# Patient Record
Sex: FEMALE | Race: WHITE
[De-identification: names, ages, dates, MRNs, and addresses within clinical notes are randomized per-mention and may not be internally consistent; named-entity substitution may affect disease eponyms.]

---

## 2017-03-16 NOTE — EDPHY
H & P


Time Seen by Provider: 03/16/17 16:02


HPI/ROS: 


CHIEF COMPLAINT: Found down, possible seizure





HISTORY OF PRESENT ILLNESS: This patient is a 56 year old female with history 

of grand mal and petite mal seizures who presents by EMS under a Limited Trauma 

Activation after she was found down on the sidewalk at [facility] this 

afternoon. Upon arrival, she is alert and oriented. Her primary complaint is 

head pain severity 7/10 localized to her posterior scalp with a scalp 

laceration. She denies neck or back pain. She does not remember the incident 

but reports that she suspects it is a seizure. She takes Lamictal for seizures 

and has not had any recent changes to her dosing. Medical history includes 

remote history of breast cancer and double mastectomy.





REVIEW OF SYSTEMS:


Constitutional: No weakness


Eyes: No visual changes or eye pain


Head: +pain to the posterior scalp


ENT: No dental trauma


Neck: No pain or injury


Respiratory: No shortness of breath


Cardiac: No chest pain


Gastrointestinal: No abdominal pain, no vomiting


Back: No pain or injury


Genitourinary: No hematuria


Musculoskeletal: No joint pain


Skin: +head laceration


Neurological: No headache, no dizziness





Past Medical/Surgical History: 


History of breast cancer with double mastectomy 10 years PTA. Seizures (Lamictal

); followed Dr. Blackman, neurologist.


Social History: 


Denies drug or alcohol use.


Physical Exam: 


General Appearance: Alert, pleasant


Head:  4 cm scalp laceration posteriorly


Eyes: No conjunctival erythema, PERRLA, EOMI


ENT, Mouth: No hemotympanum, no oral trauma, no bony tenderness


Neck: Non-tender, full range of motion without pain


Respiratory: No chest wall tenderness, lungs clear bilaterally


Cardiovascular:  Regular rate and rhythm 


Abdomen:  Abdomen is soft and non tender


Skin:  2cm posterior scalp laceration, no abrasions


Back: No midline T/L/S tenderness


Extremities:  Pelvis is stable and nontender; no extremity tenderness or 

deformity, full range of motion without pain


Neurological:  A&Ox3, normal motor function, normal sensory exam, cranial 

nerves intact


Psychiatric: Mood and affect normal





Constitutional: 


 Initial Vital Signs











Temperature (C)  37.2 C   03/16/17 16:09


 


Heart Rate  90   03/16/17 16:09


 


Respiratory Rate  18   03/16/17 16:09


 


Blood Pressure  135/91 H  03/16/17 16:09


 


O2 Sat (%)  99   03/16/17 16:09








 











O2 Delivery Mode               Room Air














Allergies/Adverse Reactions: 


 





Sulfa (Sulfonamide Antibiotics) Allergy (Verified 06/28/16 00:42)


 








Home Medications: 














 Medication  Instructions  Recorded


 


ALPRAZolam [Xanax 1 MG (*)] 1 mg PO DAILY PRN 06/28/16


 


Ibuprofen [Motrin (*)] 200 mg PO DAILY PRN 06/28/16


 


lamoTRIgine [LamICTAL 100 MG (*)] 200 mg PO DAILY18 06/28/16


 


lamoTRIgine [LamICTAL 100 MG (*)] 400 mg PO DAILY 06/28/16


 


Acetaminophen [Tylenol 325mg (*)] 650 mg PO Q4 PRN #0 tab 06/29/16


 


Zolpidem Tartrate [Ambien 10 mg] 10 mg PO HS 03/16/17














Medical Decision Making





- Diagnostics


Imaging: 


Study: CT of the head without IV contrast


Indication: Seizure, trauma


Results: CT scan of the head was obtained. The results of the study are: 

Negative noncontrast CT of the head with no intracranial posttraumatic sequela 

identified. The study was read by the radiologist, Dr. Eliezer Gaviria. I viewed the 

images myself on the PACS system.


Procedures: 


Procedure:  Laceration repair.





The 4cm laceration on the posterior scalp was anesthetized using lidocaine and 

epinephrine. The wound was irrigated, draped and explored to its base with a 

gloved finger. There were no deep structures involved. No foreign body 

palpable. The wound was repaired with staples. The wound repair was simple.  





ED Course/Re-evaluation: 





1603: Took EMS report at bedside. Found down, A&O x 2. Limited Trauma 

Activation downgraded on patient arrival.





This patient has a history of seizures for which she is followed by a 

neurologist and has been prescribed Lamictal. Her last reported seizure was 

petite mal four days prior to arrival; she does not recall the last time she 

had a grand mal seizure but suspects that this is what she experienced today. 

Her exam is benign apart from a laceration to the posterior scalp. Will proceed 

with wound care, laceration repair, and CT of the head without contrast.





1721: Laceration repair performed by myself (see procedure note).





The patient had a recurrent seizure while in the ED. Given this, she will need 

to be admitted for observation. 


IV established. 500mg IV Keppra with 100ml IV NS administered.  No recurrent 

seizures after IV Keppra.





1932: Consultation with Dr. Ethel Hanson, hospitalist, who accepts admission.





- Data Points


Laboratory Results: 


 Laboratory Results





 03/16/17 19:25 





 03/16/17 19:25 








Medications Given: 


 








Discontinued Medications





Levetiracetam 500 mg/ Sodium (Chloride)  105 mls @ 420 mls/hr IV EDNOW ONE


   Stop: 03/16/17 19:32


   Last Admin: 03/16/17 19:51 Dose:  105 mls


Miscellaneous Medication (Zolpidem Tartrate [Ambien 10 Mg])  10 mg PO HS ANTONIA


   Stop: 09/13/17 20:59


   Last Admin: 03/16/17 23:19 Dose:  10 mg


Ondansetron HCl (Zofran Odt)  4 mg PO ONCE ONE


   Stop: 03/16/17 20:38


   Last Admin: 03/16/17 17:30 Dose:  4 mg








Departure





- Departure


Disposition: [facility] Inpatient Acute


Clinical Impression: 


 Seizure





Scalp laceration


Qualifiers:


 Encounter type: initial encounter Qualified Code(s): S01.01XA - Laceration 

without foreign body of scalp, initial encounter





Condition: Fair


Report Scribed for: Jaclyn Christopher


Report Scribed by: Shell Alford


Date of Report: 03/16/17


Time of Report: 16:02

## 2017-03-17 NOTE — GHP
[f 
rep st]



                                                            HISTORY AND PHYSICAL





DATE OF ADMISSION:  03/16/2017



CHIEF COMPLAINT:  Found down.



HISTORY OF PRESENT ILLNESS:  This is a 56-year-old female with history of 
breast cancer as well as seizure disorder with complex, partial, and grand mall 
seizures who was brought to the emergency department after being found down on 
the sidewalk at [facility] this afternoon.  The patient does not recall 
what happened.  Per ER and EMS report, she presented to the ER as a limited 
trauma activation after she was found down bleeding from her head.  Upon 
arrival to the ED she was alert and oriented, complaining of headache and pain 
over her scalp.  The patient states that she has been compliant with her 
seizure medications.  She takes Lamictal.  Her last grand mal seizure was about 
3 weeks ago but she has been having many petite mal seizures which is not 
unusual for her.  She denies any fevers or chills.  She denies any head trauma 
prior to the episode today.  She denies any focal numbness or weakness.



PAST MEDICAL HISTORY:  

1.  Seizure disorder with petit mall and grand mall seizures, followed by Dr. Blackman in [place].

2.  Breast cancer status post bilateral mastectomy reconstruction and adjuvant 
therapy.



MEDICATIONS:  Reviewed.  Refer to [facility] for details.



ALLERGIES:  Sulfa.



SOCIAL HISTORY:  Lives in [place].  She denies any alcohol, tobacco, or illicit 
drug use.



FAMILY HISTORY:  Significant for breast cancer in her grandmother.



REVIEW OF SYSTEMS:  Comprehensive 10-point review of systems was done and is 
negative except for as mentioned in the HPI.



PHYSICAL EXAM:  VITAL SIGNS:  Blood pressure 122/75, pulse of 90 respiratory 
rate 16, O2 saturation 96% on room air.  Temperature afebrile.  GENERAL:  No 
acute distress.  HEAD:  Normocephalic.  There is a closed posterior scalp 
laceration with staples.  NECK:  Supple.  No lymphadenopathy.  CARDIOVASCULAR:  
S1, S.  no JVD.  No lower extremity edema.  PULMONARY:  Lungs are clear.  No 
wheezes, rales, or rhonchi.  ABDOMEN:  Soft, nontender, nondistended.  No 
guarding or rebound tenderness.  Normoactive bowel sounds.  EXTREMITIES:  No 
clubbing or cyanosis.  NEURO:  Cranial nerves 2-12 grossly intact.  No focal 
motor or sensory deficits.  Moves all extremities.  Vision is normal.  Speech 
is fluent.  SKIN:  Clear no rashes.  There is some erythema over her right AC.



DIAGNOSTICS:  WBC 18, hemoglobin 14, hematocrit 42.3, platelets 244.  Sodium 141
, potassium 3.8, chloride 104, CO2 13, BUN 8, creatinine 0.7, glucose 133.  



Head CT was reviewed and was negative for intracranial posttraumatic sequelae. 



Brain MRI done 06/28/2016 was reviewed.  Was negative for acute infarct.  There 
was no intra-axial enhancing lesions or abnormal leptomeningeal enhancement.  
There were a few nonspecific hyperintense T2 flair signal abnormalities in the 
white matter of the bilateral cerebral hemispheres.



ASSESSMENT AND PLAN:  This is a 56-year-old female with history of breast 
cancer and seizure disorder presenting with:  

1.  Breakthrough seizures resulting in head trauma.  Plan:  Patient will be 
placed on observation.  She has been loaded on Keppra in the emergency 
department.  Neurology will be consulted.  Will check LFTs and magnesium level 
to complete a metabolic workup for seizures.  

2.  Leukocytosis likely from stress demargination in the setting of seizure 
without obvious infectious etiology.  Plan:  Monitor for signs and symptoms of 
infection.

3.  Scalp laceration.  Plan:  Recommend staple removal per ER recommendations 
as well as wound care.





Job #:  [id number]

MTDD

## 2017-03-17 NOTE — GCON
[f rep st]



                                                                    CONSULTATION





REFERRING PHYSICIAN:  Nacho Tse DO



HISTORY:  The patient is a 56-year-old woman, who is presenting for evaluation of seizure and being 
found down.  History is obtained in review of the medical records, as well as discussion with the mariangel gonzales, but she does not know anything except being at some type of an appointment at [facility], and then waking up here in the emergency department.  She has a history of breast c
ancer, and says that perhaps 10 years ago started having seizures that she describes as petit mal an
d grand mal seizures.  She says the last time she had a grand mal seizure was about 2 months ago.  S
he describes medeiros mal seizures characterized by 5 minutes of being somewhat out of it and a little 
confused, but says that people looking at her might not even know she is having a seizure.  She is f
ollowed by Dr. Blackman at [facility] for neurologic care, and she is on Lamictal.  She told me she 
is taking 250 mg a day, but that is not what is documented in the chart.  She actually came to the e
mergency department when she was found down on the sidewalk at [address] [place] [address], and she has absolute
ly no memory for exactly what happened.  She was complaining of a headache when she got to the emerg
ency department, and was alert, though, and oriented.  She apparently takes her medicines regularly.
  She reports having some of the medeiros mal seizures as often as 3 times a week.  I think details are
 unreliable at this point. 



No fever, chills, nausea, vomiting, or diarrhea.  The patient has a prior history of breast cancer. 
 She has become homeless, although she says she stays at a shelter. 



She is not smoking or drinking or using drugs.



ALLERGIES:  Sulfa.



FAMILY HISTORY:  Notable for breast cancer in grandmother.



LISTED MEDICATIONS:  Lamotrigine 200 mg and 400 mg for a total of 600 mg daily, Xanax 1 mg as needed
.



PHYSICAL EXAM:  VITAL SIGNS:  Temperature is 37, blood pressure 102/63, pulse of 76, respirations 16
.  GENERAL:  She is well developed, in no acute distress.  EYES:  Clear.  NECK:  Supple.  No bruits 
or masses.  CARDIAC:  Regular rate and rhythm.  No murmur.  NEUROLOGIC:  She is awake, alert, attent
nichole, fully oriented.  Pupils 3 mm and reactive.  Extraocular movements are intact.  Normal facial se
nsation and strength.  Motor exam:  Normal muscle bulk and tone.  5/5 strength with no abnormal move
ments.  Sensation is preserved for temperature and light touch.  Reflexes are 2+ and symmetric.



LABORATORY STUDIES:  White count of 18,000.  Chemistries unremarkable except for bicarb of 13.  Norm
al liver enzymes.



IMPRESSION:  The patient has a history of seizures described as petit mal and grand mal.  The descri
ption of the petit mals may or may not represent complex partial phenomenon or other generalized sei
zure-type phenomena versus conversion disorder, but at this point, I have no evidence to directly co
ntradict that she is having intermittent seizures, including the generalized tonic-clonic events mago
t have been described.  She is on reasonable doses of medication for now.  I am not going to make an
y change.  She should follow up with Dr. Blackman when is she stabilized, and she seems to be doing
 relatively well at the moment, but wait until she is fully back to her baseline for discharge seems
 reasonable.  Total unit time was 55 minutes.





Job #:  [id number]

## 2017-03-17 NOTE — HOSPPROG
Hospitalist Progress Note


Assessment/Plan: 








# seizure, with known seizure disorder - improved; seen by Dr Lujan


   - cont keppra, lamictal


# scalp laceration - s/p repair in ED


# dizziness/possible concussion - still too dizzy to be discharged


   - IVF, symptomatic tx today


# leukocytosis - likely stress, recheck tomorrow


# homelessness





# dispo - likely tomorrow if dizziness improves





##


new pt to me


chart reviewed


CT head reviewed








Subjective: still very dizzy


Objective: 


 Vital Signs











Temp Pulse Resp BP Pulse Ox


 


 36.7 C   85   16   139/73 H  97 


 


 03/17/17 12:34  03/17/17 12:34  03/17/17 12:34  03/17/17 12:34  03/17/17 12:08








 











 03/16/17 03/17/17 03/18/17





 05:59 05:59 05:59


 


Intake Total   1000


 


Balance   1000














- Physical Exam


Constitutional: no apparent distress, appears nourished


Cardiovascular: regular rate and rhythym, no murmur, rub, or gallop


Respiratory: no respiratory distress, no rales or rhonchi, clear to auscultation


Gastrointestinal: normoactive bowel sounds, soft, non-tender abdomen, no 

palpable masses





ICD10 Worksheet


Patient Problems: 


 Problems











Problem Status Onset


 


Scalp laceration Acute  


 


Seizure Acute  


 


Seizure disorder Acute

## 2017-03-18 NOTE — HOSPPROG
Hospitalist Progress Note


Assessment/Plan: 


 patient is a 56-year-old female with history of breast cancer, seizure 

disorder who is brought to the emergency department after found down on the 

sidewalk.  She has no recollection of what happened.  Upon arrival to the ED 

she was alert and oriented.  Today is my 1st encounter with the patient.  Chart 

reviewed. Reviewed her care with Dr Lujan.





# seizure, with known seizure disorder - 


   - cont Keppra, Lamictal


   - CT of head showed nothing acute


   - Dr Lujan to decide if her doses should be changed





# scalp laceration - s/p repair in ED


   -will need staples removed in 7-10 days





# dizziness/likely a concussion - 


   - speech therapy, physical therapy, and occupational therapy seeing


   -having frequent headaches and dizziness





#hx of breast cancer 


   had a mastectomy





# leukocytosis -   Resolved





# homelessness


   - very concerned about discharging her with a concussion, dizziness, and 

seizures


   -CM to look at possible placement and to discuss with the patient





# dispo - pending/ she is not safe to be discharged











Subjective: Charline is c/o dizziness and headache.


Objective: 


 Vital Signs











Temp Pulse Resp BP Pulse Ox


 


 36.6 C   64   14   124/76 H  97 


 


 03/18/17 07:40  03/18/17 07:40  03/18/17 07:40  03/18/17 07:40  03/18/17 07:40








 Laboratory Results





 03/18/17 04:25 





 03/18/17 04:25 





 











 03/17/17 03/18/17 03/19/17





 05:59 05:59 05:59


 


Intake Total  1800 


 


Output Total  250 


 


Balance  1550 














- Physical Exam


Constitutional: no apparent distress, appears nourished, not in pain


Eyes: PERRL


Ears, Nose, Mouth, Throat: hearing normal


Cardiovascular: regular rate and rhythym


Respiratory: no respiratory distress


Gastrointestinal: normoactive bowel sounds


Skin: warm, other (staples on occipital area of head in place/ edges of 

laceration well approximated)


Musculoskeletal: no muscle tenderness


Neurologic: AAOx3


Psychiatric: interacting appropriately, not anxious





ICD10 Worksheet


Patient Problems: 


 Problems











Problem Status Onset


 


Scalp laceration Acute  


 


Seizure Acute  


 


Seizure disorder Acute

## 2017-03-19 NOTE — HOSPPROG
Hospitalist Progress Note


Assessment/Plan: 


 patient is a 56-year-old female with history of breast cancer, seizure 

disorder who is brought to the emergency department after found down on the 

sidewalk.  She has no recollection of what happened.  Upon arrival to the ED 

she was alert and oriented.  





# seizure, with known seizure disorder - 


   - cont Keppra, Lamictal


   - CT of head showed nothing acute


   - Dr Lujan to decide if her doses should be changed





# scalp laceration - s/p repair in ED


   -will need staples removed in 7-10 days





# dizziness/likely a concussion - 


   - speech therapy, physical therapy, and occupational therapy seeing


   - continues to be very dizzy/ concerned about her ambulating





#hx of breast cancer 


   had a mastectomy





# leukocytosis -   Resolved





# homelessness


   - very concerned about discharging her with a concussion, dizziness, and 

seizures


   -CM to look at possible placement and to discuss with the patient





# dispo - pending/ she is not safe to be discharged











Subjective: Charline continues to have a headache.


Objective: 


 Vital Signs











Temp Pulse Resp BP Pulse Ox


 


 37 C   72   14   120/72   96 


 


 03/19/17 07:28  03/19/17 07:28  03/19/17 07:28  03/19/17 07:28  03/19/17 07:28








 Laboratory Results





 03/18/17 04:25 





 03/18/17 04:25 





 











 03/18/17 03/19/17 03/20/17





 05:59 05:59 05:59


 


Intake Total 1800 1350 


 


Output Total 250 450 


 


Balance 1550 900 














- Physical Exam


Constitutional: no apparent distress, appears nourished, not in pain


Eyes: PERRL


Ears, Nose, Mouth, Throat: hearing normal, other (tongue lac on right side/

healing)


Cardiovascular: regular rate and rhythym


Respiratory: no respiratory distress


Gastrointestinal: normoactive bowel sounds


Skin: warm


Musculoskeletal: no muscle tenderness


Neurologic: AAOx3


Psychiatric: interacting appropriately, not anxious, not encephalopathic





ICD10 Worksheet


Patient Problems: 


 Problems











Problem Status Onset


 


Scalp laceration Acute  


 


Seizure Acute  


 


Seizure disorder Acute

## 2017-03-20 NOTE — HOSPPROG
Hospitalist Progress Note


Assessment/Plan: 


 patient is a 56-year-old female with history of breast cancer, seizure 

disorder who is brought to the emergency department after found down on the 

sidewalk.  She has no recollection of what happened.  Upon arrival to the ED 

she was alert and oriented.  





# seizure, with known seizure disorder - 


   - cont Keppra, Lamictal


   - CT of head showed nothing acute


   - she sees Dr TRENT Blackman at [facility]/ I spoke w her office

[place]


   - reviewed her care with neurology today/ their recommendation is to avoid 

any Xanax which she takes prn/ increases seizure threshold


   - she say she has not used Xanax in a long time


   - Lamictal level is pending





# scalp laceration - s/p repair in ED


   -will need staples removed in 7-10 days





# dizziness/likely a concussion - 


   - speech therapy, physical therapy, and occupational therapy seeing


   - continues to be very dizzy/ concerned about her ambulating


   -w hen I evaluated her today, she had difficulty with walking/ she doesn't 

want to go to a SNF





#hx of breast cancer 


   had a mastectomy





# leukocytosis -   Resolved





# homelessness


   - very concerned about discharging her with a concussion, dizziness, and 

seizures


   - she has a room at the shelter





# dispo - pending/ she is not safe to be discharged. Declining SNF/


Number for [facility] is [phone] (x [phone])/ I spoke with the 

MA who said Dr Blackman returns on Tuesday. Will ask next provider caring for 

Charline to call and discuss Lamictal dosing.  We have a Lamictal level pending/ 

there is no recent level in her office. Patient has been updated on this plan.  











Subjective: Charline is feeling better daily but having some difficulty w walking.


Objective: 


 Vital Signs











Temp Pulse Resp BP Pulse Ox


 


 36.9 C   70   16   132/77 H  97 


 


 03/20/17 07:27  03/20/17 07:27  03/20/17 07:27  03/20/17 07:27  03/20/17 07:27








 Laboratory Results





 03/18/17 04:25 





 03/18/17 04:25 





 











 03/19/17 03/20/17 03/21/17





 05:59 05:59 05:59


 


Intake Total 1350 1700 


 


Output Total 450 400 


 


Balance 900 1300 














- Physical Exam


Constitutional: no apparent distress, appears nourished, not in pain


Eyes: PERRL


Ears, Nose, Mouth, Throat: hearing normal


Respiratory: no respiratory distress


Gastrointestinal: normoactive bowel sounds


Skin: warm


Musculoskeletal: abnormal gait (gait is unstable/she walks with a widened gait)

, generalized weakness


Neurologic: AAOx3


Psychiatric: interacting appropriately, not anxious, not encephalopathic, 

thought process linear





ICD10 Worksheet


Patient Problems: 


 Problems











Problem Status Onset


 


Seizure disorder Acute  


 


Seizure Acute  


 


Scalp laceration Acute

## 2017-03-21 NOTE — PDIAF
- Diagnosis


Diagnosis: Seizure


Code Status: Full Code





- Medication Management


Discharge Medications: 


 Medications to Continue on Transfer





lamoTRIgine [LamICTAL 100 MG (*)] 200 mg PO DAILY18 06/28/16 [Last Taken 03/15/

17]


lamoTRIgine [LamICTAL 100 MG (*)] 400 mg PO DAILY 06/28/16 [Last Taken 03/16/17]


Acetaminophen [Tylenol 325mg (*)] 650 mg PO Q4 PRN #0 tab 06/29/16 [Last Taken 

Unknown]


Zolpidem Tartrate [Ambien 10 mg] 10 mg PO HS 03/16/17 [Last Taken 03/15/17]


Ibuprofen [Motrin (*)] 400 mg PO Q6HRS PRN #0 tab 03/21/17 [Last Taken Unknown]








Discharge Medications: Refer to the Discharge Home Medication list for PRN 

reason.





- Orders


Services needed: Registered Nurse, Physical Therapy, Occupational Therapy, 

Speech Language Pathologist





- Follow Up Care


Current Providers and Referrals: 


[facility] [Non Staff Provider (MD)] - As per Instructions

## 2017-03-21 NOTE — GDS
[f rep st]



                                                             DISCHARGE SUMMARY





DISCHARGE DIAGNOSES:  

1.  Seizure.

2.  Head laceration.

3.  Dizziness.

4.  History of breast cancer.

5.  Leukocytosis.

6.  Homelessness.



STUDIES/PROCEDURES:  

1.  CT of the head.

2.  Scalp laceration suturing.



PHYSICAL EXAMINATION:  GENERAL:  The patient is alert.  VITAL SIGNS:  Afebrile at 36.7, pulse 69, re
spiratory rate 14 blood pressure is 121/76.  She is saturating 99% on room air.  I have seen and salomon
luated the patient on the day of discharge.



HOSPITAL COURSE:  The patient is a 56-year-old female who was found down on the sidewalk.  She prese
nted to the emergency room via EMS and was diagnosed with: 

1.  Seizure:  She does have a known seizure disorder.  She is on Keppra as well as Lamictal.  She wi
ll follow up with her primary neurologist, Dr. Blackman, and she has been educated to avoid Xanax i
n the future as it increases her seizure threshold.  She is in agreement with this plan.

2.  Scalp laceration:  This has been stapled in the emergency room and will require staple removal i
n 3 days.

3.  Dizziness:  Patient likely suffered a concussion.  She has been evaluated by Physical Therapy, a
s well as Speech Therapy and Occupational Therapy.  Skilled nursing facility is recommended at the t
jostin of disposition.

4.  History of breast cancer:  This is stable.

5.  Leukocytosis:  This has resolved.

6.  Homelessness:  The patient is in a program with the shelter.



DISPOSITION:  The patient will be discharged to [facility] for further rehabilitation and management.
  She does suffer from some ataxia and other concerns.  She has been evaluated by Physical Therapy a
nd Occupational therapy.  



I spent greater than 35 minutes in the care, coordination, and management of the patient's dispositi
on.



DISCHARGE MEDICATIONS:  Please refer to EMR form.  She will continue on Lamictal as well as Motrin a
tracy Grandeien.





Job #:  [id number]

## 2017-06-01 ENCOUNTER — HOSPITAL ENCOUNTER (EMERGENCY)
Dept: HOSPITAL 80 - FED | Age: 57
LOS: 1 days | Discharge: HOME | End: 2017-06-02
Payer: COMMERCIAL

## 2017-06-01 VITALS — RESPIRATION RATE: 16 BRPM

## 2017-06-01 DIAGNOSIS — G40.909: Primary | ICD-10-CM

## 2017-06-01 DIAGNOSIS — Z85.3: ICD-10-CM

## 2017-06-01 DIAGNOSIS — Z59.0: ICD-10-CM

## 2017-06-01 LAB
% IMMATURE GRANULYOCYTES: 0.2 % (ref 0–1.1)
ABSOLUTE IMMATURE GRANULOCYTES: 0.03 10^3/UL (ref 0–0.1)
ABSOLUTE NRBC COUNT: 0 10^3/UL (ref 0–0.01)
ADD DIFF?: NO
ADD MORPH?: NO
ADD SCAN?: NO
ANION GAP SERPL CALC-SCNC: 17 MEQ/L (ref 8–16)
ATYPICAL LYMPHOCYTE FLAG: 0 (ref 0–99)
CALCIUM SERPL-MCNC: 10.1 MG/DL (ref 8.5–10.4)
CHLORIDE SERPL-SCNC: 103 MEQ/L (ref 97–110)
CO2 SERPL-SCNC: 20 MEQ/L (ref 22–31)
CREAT SERPL-MCNC: 0.9 MG/DL (ref 0.6–1)
ERYTHROCYTE [DISTWIDTH] IN BLOOD BY AUTOMATED COUNT: 13.2 % (ref 11.5–15.2)
ETHANOL SERPL-MCNC: < 10 MG/DL (ref 0–10)
FRAGMENT RBC FLAG: 0 (ref 0–99)
GFR SERPL CREATININE-BSD FRML MDRD: > 60 ML/MIN/{1.73_M2}
GLUCOSE SERPL-MCNC: 84 MG/DL (ref 70–100)
HCT VFR BLD CALC: 43 % (ref 38–47)
HGB BLD-MCNC: 14.7 G/DL (ref 12.6–16.3)
INR PPP: 0.92 (ref 0.83–1.16)
LEFT SHIFT FLG: 0 (ref 0–99)
LIPEMIA HEMOLYSIS FLAG: 90 (ref 0–99)
MCH RBC BLDCO QN: 31.6 PG (ref 27.9–34.1)
MCHC RBC AUTO-ENTMCNC: 34.2 G/DL (ref 32.4–36.7)
MCV RBC AUTO: 92.5 FL (ref 81.5–99.8)
NRBC-AUTO%: 0 % (ref 0–0.2)
PLATELET # BLD: 235 10^3/UL (ref 150–400)
PLATELET CLUMPS FLAG: 0 (ref 0–99)
PMV BLD AUTO: 11.1 FL (ref 8.7–11.7)
POTASSIUM SERPL-SCNC: 4.5 MEQ/L (ref 3.5–5.2)
PROTHROMBIN TIME: 12.3 SEC (ref 12–15)
RBC # BLD AUTO: 4.65 10^6/UL (ref 4.18–5.33)
SODIUM SERPL-SCNC: 140 MEQ/L (ref 134–144)
TROPONIN I SERPL-MCNC: < 0.012 NG/ML (ref 0–0.03)

## 2017-06-01 PROCEDURE — G0480 DRUG TEST DEF 1-7 CLASSES: HCPCS

## 2017-06-01 SDOH — ECONOMIC STABILITY - HOUSING INSECURITY: HOMELESSNESS: Z59.0

## 2017-06-01 NOTE — CPEKG
Heart Rate: 81

RR Interval: 741

P-R Interval: 144

QRSD Interval: 82

QT Interval: 396

QTC Interval: 460

P Axis: 53

QRS Axis: 7

T Wave Axis: 44

EKG Severity - NORMAL ECG -

EKG Impression: SINUS RHYTHM

Electronically Signed By: Rafael Navarrete 02-Jun-2017 07:11:53
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
## 2017-06-02 VITALS
OXYGEN SATURATION: 94 % | TEMPERATURE: 98.6 F | SYSTOLIC BLOOD PRESSURE: 141 MMHG | HEART RATE: 85 BPM | DIASTOLIC BLOOD PRESSURE: 80 MMHG

## 2017-06-03 ENCOUNTER — HOSPITAL ENCOUNTER (INPATIENT)
Dept: HOSPITAL 80 - FED | Age: 57
LOS: 5 days | Discharge: HOME | DRG: 101 | End: 2017-06-08
Attending: FAMILY MEDICINE | Admitting: INTERNAL MEDICINE
Payer: COMMERCIAL

## 2017-06-03 DIAGNOSIS — E86.0: ICD-10-CM

## 2017-06-03 DIAGNOSIS — Z85.3: ICD-10-CM

## 2017-06-03 DIAGNOSIS — R42: ICD-10-CM

## 2017-06-03 DIAGNOSIS — T42.75XA: ICD-10-CM

## 2017-06-03 DIAGNOSIS — Z59.0: ICD-10-CM

## 2017-06-03 DIAGNOSIS — R74.0: ICD-10-CM

## 2017-06-03 DIAGNOSIS — G40.409: Primary | ICD-10-CM

## 2017-06-03 LAB
% IMMATURE GRANULYOCYTES: 0.2 % (ref 0–1.1)
ABSOLUTE IMMATURE GRANULOCYTES: 0.02 10^3/UL (ref 0–0.1)
ABSOLUTE NRBC COUNT: 0 10^3/UL (ref 0–0.01)
ADD DIFF?: NO
ADD MORPH?: NO
ADD SCAN?: NO
ALBUMIN SERPL-MCNC: 5 G/DL (ref 3.5–5)
ALP SERPL-CCNC: 81 IU/L (ref 38–126)
ALT SERPL-CCNC: 136 IU/L (ref 9–52)
ANION GAP SERPL CALC-SCNC: 18 MEQ/L (ref 8–16)
AST SERPL-CCNC: 209 IU/L (ref 14–46)
ATYPICAL LYMPHOCYTE FLAG: 0 (ref 0–99)
BILIRUB SERPL-MCNC: 2.8 MG/DL (ref 0.1–1.4)
BILIRUBIN-CONJUGATED: 0.9 MG/DL (ref 0–0.5)
BILIRUBIN-UNCONJUGATED: 1.9 MG/DL (ref 0–1.1)
CALCIUM SERPL-MCNC: 9.9 MG/DL (ref 8.5–10.4)
CHLORIDE SERPL-SCNC: 102 MEQ/L (ref 97–110)
CO2 SERPL-SCNC: 19 MEQ/L (ref 22–31)
COLOR UR: (no result)
CREAT SERPL-MCNC: 0.9 MG/DL (ref 0.6–1)
ERYTHROCYTE [DISTWIDTH] IN BLOOD BY AUTOMATED COUNT: 12.6 % (ref 11.5–15.2)
ETHANOL SERPL-MCNC: < 10 MG/DL (ref 0–10)
FRAGMENT RBC FLAG: 0 (ref 0–99)
GFR SERPL CREATININE-BSD FRML MDRD: > 60 ML/MIN/{1.73_M2}
GLUCOSE SERPL-MCNC: 69 MG/DL (ref 70–100)
HCT VFR BLD CALC: 43.5 % (ref 38–47)
HGB BLD-MCNC: 15 G/DL (ref 12.6–16.3)
LEFT SHIFT FLG: 0 (ref 0–99)
LIPEMIA HEMOLYSIS FLAG: 90 (ref 0–99)
MCH RBC BLDCO QN: 31.7 PG (ref 27.9–34.1)
MCHC RBC AUTO-ENTMCNC: 34.5 G/DL (ref 32.4–36.7)
MCV RBC AUTO: 92 FL (ref 81.5–99.8)
NRBC-AUTO%: 0 % (ref 0–0.2)
PLATELET # BLD: 281 10^3/UL (ref 150–400)
PLATELET CLUMPS FLAG: 0 (ref 0–99)
PMV BLD AUTO: 11.4 FL (ref 8.7–11.7)
POTASSIUM SERPL-SCNC: 4.6 MEQ/L (ref 3.5–5.2)
PROT SERPL-MCNC: 8.4 G/DL (ref 6.3–8.2)
RBC # BLD AUTO: 4.73 10^6/UL (ref 4.18–5.33)
SALICYLATES SERPL-MCNC: < 1 MG/DL (ref 2–20)
SODIUM SERPL-SCNC: 139 MEQ/L (ref 134–144)

## 2017-06-03 PROCEDURE — G0378 HOSPITAL OBSERVATION PER HR: HCPCS

## 2017-06-03 PROCEDURE — G0480 DRUG TEST DEF 1-7 CLASSES: HCPCS

## 2017-06-03 SDOH — ECONOMIC STABILITY - HOUSING INSECURITY: HOMELESSNESS: Z59.0

## 2017-06-03 NOTE — EDPHY
H & P


Stated Complaint: M1


Source: Police





- Personal History


Current Tetanus/Diphtheria Vaccine: Yes


Current Tetanus Diphtheria and Acellular Pertussis (TDAP): Yes





- Medical/Surgical History


Hx Asthma: No


Hx Chronic Respiratory Disease: No


Hx Diabetes: No


Hx Cardiac Disease: No


Hx Renal Disease: No


Hx Cirrhosis: No


Hx Alcoholism: No


Hx HIV/AIDS: No


Hx Splenectomy or Spleen Trauma: No


Other PMH: SZ DISORDER, BREAST CANCER, KNEE SURGERY





- Social History


Smoking Status: Never smoked


Time Seen by Provider: 06/03/17 11:22


HPI/ROS: 


CHIEF COMPLAINT:  AMS, compatible





HISTORY OF PRESENT ILLNESS:  This is a 57-year-old female brought in by police 

department, please reports patient was found knocking on doors threatening 

people stating "your trying to take my baby" police stated she was threatening 

them as well.  Patient agitated pacing.  Patient was seen here on 06/01/2017 

for similar symptoms, AMS CT head was negative EKG was normal discharge on her 

own.  Patient states she would like some food "I have any eaten in several weeks

". 





REVIEW OF SYSTEMS:


Constitutional:  No fever, no chills.


Eyes:  No blurred vision


ENT:  No sore throat.


Cardiovascular:  No chest pain, no palpitations.


Respiratory:  No cough, no shortness of breath.


Gastrointestinal:  No abdominal pain, no vomiting.


Genitourinary:  No difficulty urinating


Musculoskeletal:  No back pain.


Skin:  No rashes.


Neurological:  No headache. (Марина Cavazos)





- Physical Exam


Exam: 


General Appearance:  Alert, no distress.


Eyes:  Pupils equal and round no pallor or injection.


ENT, Mouth:  Mucous membranes moist.


Respiratory:  There are no retractions, lungs are clear to auscultation.


Cardiovascular:  Regular rate and rhythm.


Gastrointestinal:  Abdomen is soft and nontender, no masses


Neurological: memory loss, has episodes of cognitive lucidity.  


Skin:  Warm and dry, no rashes.


Musculoskeletal:  Neck is supple nontender.


Extremities:  symmetrical, full range of motion.


Psychiatric:  Patient is oriented to person, month and date , there is agitation

, anger.


 (Марина Cavazos)


Constitutional: 





 Initial Vital Signs











Temperature (C)  36.4 C   06/03/17 09:59


 


Heart Rate  91   06/03/17 09:59


 


Respiratory Rate  16   06/03/17 09:59


 


Blood Pressure  99/88 H  06/03/17 09:59


 


O2 Sat (%)  94   06/03/17 09:59








 











O2 Delivery Mode               Room Air














Allergies/Adverse Reactions: 


 





Sulfa (Sulfonamide Antibiotics) Allergy (Verified 06/01/17 23:14)


 








Home Medications: 














 Medication  Instructions  Recorded


 


Zolpidem Tartrate [Ambien 10 mg] 10 mg PO HS 03/16/17


 


lamoTRIgine [Lamictal] 200 mg PO DAILY #30 tablet 06/02/17


 


ALPRAZolam [Xanax 1 MG (*)] 1 mg PO BID PRN 06/03/17














Medical Decision Making


ED Course/Re-evaluation: 


Spoke with Kali with [facility], she states there is no psych  component to her 

altered mental status more likely dementia.





1225:  Discussed with Dr. Fitzgerald, patient to be admitted for AMS 





1230:  Patient inpatient admit  to Dr. Gonzales





1245:  Spoke with Tisha with case management on the hospital unit, patient 

will be evaluated for outpatient placement during her hospital stay (Марина Cavazos)





I did not see this patient while she was in the emergency department.  However 

her care was discussed with PA while the patient was in the department.  I 

agree with treatment plan and management (Uche Fitzgerald)


Differential Diagnosis: 


Other differential diagnosis considered but not limited to seizure, CVA and 

electrolyte imbalance (Марина Cavazos)





- Data Points


Laboratory Results: 





 Laboratory Results





 06/03/17 11:18 





 06/03/17 11:18 





 











  06/03/17 06/03/17 06/03/17





  11:30 11:18 11:18


 


WBC      





    


 


RBC      





    


 


Hgb      





    


 


Hct      





    


 


MCV      





    


 


MCH      





    


 


MCHC      





    


 


RDW      





    


 


Plt Count      





    


 


MPV      





    


 


Neut % (Auto)      





    


 


Lymph % (Auto)      





    


 


Mono % (Auto)      





    


 


Eos % (Auto)      





    


 


Baso % (Auto)      





    


 


Nucleat RBC Rel Count      





    


 


Absolute Neuts (auto)      





    


 


Absolute Lymphs (auto)      





    


 


Absolute Monos (auto)      





    


 


Absolute Eos (auto)      





    


 


Absolute Basos (auto)      





    


 


Absolute Nucleated RBC      





    


 


Immature Gran %      





    


 


Immature Gran #      





    


 


Sodium      





    


 


Potassium      





    


 


Chloride      





    


 


Carbon Dioxide      





    


 


Anion Gap      





    


 


BUN      





    


 


Creatinine      





    


 


Estimated GFR      





    


 


Glucose      





    


 


Calcium      





    


 


Total Bilirubin    2.8 mg/dL H mg/dL  





    (0.1-1.4)  


 


Conjugated Bilirubin    0.9 mg/dL H mg/dL  





    (0.0-0.5)  


 


Unconjugated Bilirubin    1.9 mg/dL H mg/dL  





    (0.0-1.1)  


 


AST    209 IU/L H IU/L  





    (14-46)  


 


ALT    136 IU/L H IU/L  





    (9-52)  


 


Alkaline Phosphatase    81 IU/L IU/L  





    ()  


 


Total Protein    8.4 g/dL H g/dL  





    (6.3-8.2)  


 


Albumin    5.0 g/dL g/dL  





    (3.5-5.0)  


 


Beta HCG, Qual      NEGATIVE 





    


 


Salicylates      





    


 


Urine Opiates Screen  NEGATIVE     





   (NEGATIVE)   


 


Urine Barbiturates  NEGATIVE     





   (NEGATIVE)   


 


Ur Phencyclidine Scrn  NEGATIVE     





   (NEGATIVE)   


 


Ur Amphetamine Screen  NEGATIVE     





   (NEGATIVE)   


 


U Benzodiazepines Scrn  NEGATIVE     





   (NEGATIVE)   


 


Urine Cocaine Screen  NEGATIVE     





   (NEGATIVE)   


 


U Marijuana (THC) Screen  NEGATIVE     





   (NEGATIVE)   


 


Ethyl Alcohol      





    














  06/03/17 06/03/17





  11:18 11:18


 


WBC    11.01 10^3/uL H 10^3/uL





    (3.80-9.50) 


 


RBC    4.73 10^6/uL 10^6/uL





    (4.18-5.33) 


 


Hgb    15.0 g/dL g/dL





    (12.6-16.3) 


 


Hct    43.5 % %





    (38.0-47.0) 


 


MCV    92.0 fL fL





    (81.5-99.8) 


 


MCH    31.7 pg pg





    (27.9-34.1) 


 


MCHC    34.5 g/dL g/dL





    (32.4-36.7) 


 


RDW    12.6 % %





    (11.5-15.2) 


 


Plt Count    281 10^3/uL 10^3/uL





    (150-400) 


 


MPV    11.4 fL fL





    (8.7-11.7) 


 


Neut % (Auto)    78.9 % H %





    (39.3-74.2) 


 


Lymph % (Auto)    12.3 % L %





    (15.0-45.0) 


 


Mono % (Auto)    8.4 % %





    (4.5-13.0) 


 


Eos % (Auto)    0.0 % L %





    (0.6-7.6) 


 


Baso % (Auto)    0.2 % L %





    (0.3-1.7) 


 


Nucleat RBC Rel Count    0.0 % %





    (0.0-0.2) 


 


Absolute Neuts (auto)    8.70 10^3/uL H 10^3/uL





    (1.70-6.50) 


 


Absolute Lymphs (auto)    1.35 10^3/uL 10^3/uL





    (1.00-3.00) 


 


Absolute Monos (auto)    0.92 10^3/uL H 10^3/uL





    (0.30-0.80) 


 


Absolute Eos (auto)    0.00 10^3/uL L 10^3/uL





    (0.03-0.40) 


 


Absolute Basos (auto)    0.02 10^3/uL 10^3/uL





    (0.02-0.10) 


 


Absolute Nucleated RBC    0.00 10^3/uL 10^3/uL





    (0-0.01) 


 


Immature Gran %    0.2 % %





    (0.0-1.1) 


 


Immature Gran #    0.02 10^3/uL 10^3/uL





    (0.00-0.10) 


 


Sodium  139 mEq/L mEq/L  





   (134-144)  


 


Potassium  4.6 mEq/L mEq/L  





   (3.5-5.2)  


 


Chloride  102 mEq/L mEq/L  





   ()  


 


Carbon Dioxide  19 mEq/l L mEq/l  





   (22-31)  


 


Anion Gap  18 mEq/L H mEq/L  





   (8-16)  


 


BUN  40 mg/dL H mg/dL  





   (7-23)  


 


Creatinine  0.9 mg/dL mg/dL  





   (0.6-1.0)  


 


Estimated GFR  > 60   





   


 


Glucose  69 mg/dL L mg/dL  





   ()  


 


Calcium  9.9 mg/dL mg/dL  





   (8.5-10.4)  


 


Total Bilirubin    





   


 


Conjugated Bilirubin    





   


 


Unconjugated Bilirubin    





   


 


AST    





   


 


ALT    





   


 


Alkaline Phosphatase    





   


 


Total Protein    





   


 


Albumin    





   


 


Beta HCG, Qual    





   


 


Salicylates  < 1.0 mg/dL L mg/dL  





   (2.0-20.0)  


 


Urine Opiates Screen    





   


 


Urine Barbiturates    





   


 


Ur Phencyclidine Scrn    





   


 


Ur Amphetamine Screen    





   


 


U Benzodiazepines Scrn    





   


 


Urine Cocaine Screen    





   


 


U Marijuana (THC) Screen    





   


 


Ethyl Alcohol  < 10 mg/dL mg/dL  





   (0-10)  














Departure





- Departure


Disposition: [facility]


Clinical Impression: 


Altered mental status


Qualifiers:


 Altered mental status type: transient alteration of awareness Qualified Code(s)

: R40.4 - Transient alteration of awareness





Condition: Good

## 2017-06-03 NOTE — GHP
[f rep st]



                                                            HISTORY AND PHYSICAL





DATE OF ADMISSION:  06/03/2017



CHIEF COMPLAINT:  Altered mental status.



HISTORY OF PRESENT ILLNESS:  This is a 57-year-old female known to our service with a history of sei
zure disorders, who was brought to the emergency department by the emergency department after she wa
s found reportedly knocking on doors and threatening people stating, "You're trying to take my baby.
"  The patient was agitated and pacing.  She was seen at [facility] on [facility]/01/2017, where she had some similar symptoms and was ultimately sent home after her mentati
on cleared. 



During the time of my exam, the patient is still confused and disoriented.  She does not know where 
she is, but is able to tell me the year.  She has been living in her car.  She has not been taking h
er seizure medications.  She tells me that she has been having multiple seizures and bit her tongue 
a few days ago.  She denies any alcohol.  She denies any tobacco.  She denies any illicit drug use.



PAST MEDICAL HISTORY:  

1.  Seizure disorder with petite mal and grand mal seizures, followed by Dr. Blackman in [place].

2.  Breast cancer status post bilateral mastectomy and reconstruction with adjuvant therapy.



HOME MEDICATIONS:  Reviewed.  Refer to [facility] for details.



ALLERGIES:  Sulfa.



SOCIAL HISTORY:  She lives at [place] in her car.  She denies any alcohol, tobacco, or illicit drug 
use.



FAMILY HISTORY:  Reviewed and significant for breast cancer in her grandmother.



REVIEW OF SYSTEMS:  A comprehensive 10-point review of systems was done and was negative, except for
 as mentioned in the HPI.



PHYSICAL EXAM:  VITAL SIGNS:  Blood pressure 125/75, pulse is 70, respiratory rate 20, O2 saturation
 98% on room air.  GENERAL:  In no acute distress.  HEENT:  Head:  Normocephalic, atraumatic.  Eyes:
  PERRLA.  Sclerae anicteric.  Mouth:  Moist mucous membranes.  Right lateral tongue has a healing l
aceration.  There is no active bleeding.  No signs of infection.  NECK:  Supple.  No lymphadenopathy
.  CARDIOVASCULAR:  S1, S2.  No murmurs, rubs, clicks, gallops.  No JVD.  No lower extremity edema. 
 PULMONARY:  Lungs are clear.  No wheezes, rales, or rhonchi.  ABDOMEN:  Soft, nontender, nondistend
ed.  No guarding or rebound tenderness.  Normoactive bowel sounds.  EXTREMITIES:  No clubbing or cya
nosis.  NEURO:  Cranial nerves II through XII grossly intact.  No focal motor or sensory deficits.  
SKIN:  Clear no rashes.



DIAGNOSTICS:  WBC is 11.01, hemoglobin 15, hematocrit 43.5, platelets 281.  Sodium 139, potassium 4.
6, chloride 102, CO2 19, BUN 40, creatinine 0.9, glucose 69.  Urine tox screen from 06/03/2017 was u
nremarkable.  Ethyl alcohol was nondetectable.



ASSESSMENT:  This is a 57-year-old female with a history of seizure disorders presenting with acute 
encephalopathy of unclear etiology.  Differential diagnosis includes uncontrolled seizure disorder v
ersus a parasomnia from Ambien (which cannot be confirmed since the patient does not recall taking h
er Ambien) versus dehydration.



PLAN:  The patient will be placed on observation where I will start her on Keppra and continue her h
ome dose of Lamictal.  Will consult Neurology.  Will also start IV hydration.  Will check LFTs.  A h
ead CT done 03/16/2017 showed no intracranial pathology.  An MRI of her brain was done in June of 20
16 which also did not show any intracranial lesions.  Will consider repeating the MRI if her conditi
on does not improve.





Job #:  [id number]

## 2017-06-04 LAB
ANION GAP SERPL CALC-SCNC: 6 MEQ/L (ref 8–16)
CALCIUM SERPL-MCNC: 9 MG/DL (ref 8.5–10.4)
CHLORIDE SERPL-SCNC: 107 MEQ/L (ref 97–110)
CO2 SERPL-SCNC: 25 MEQ/L (ref 22–31)
CREAT SERPL-MCNC: 0.8 MG/DL (ref 0.6–1)
GFR SERPL CREATININE-BSD FRML MDRD: > 60 ML/MIN/{1.73_M2}
GLUCOSE SERPL-MCNC: 96 MG/DL (ref 70–100)
POTASSIUM SERPL-SCNC: 4 MEQ/L (ref 3.5–5.2)
SODIUM SERPL-SCNC: 138 MEQ/L (ref 134–144)

## 2017-06-04 NOTE — HOSPPROG
Hospitalist Progress Note


Assessment/Plan: 





#acute encephalopathy (resolving) 


   -she experienced was sounds like significant delusions. She denies a h/o 

psychiatric illness. I am concerned that her confusion was triggered by 

uncontrolled epilepsy


 


Plan:


   -cont keppra and lamictal as ordered


   -neuro consult pending





#resolved dehydration


   -buff cap ivf and encourage oral hydration





#h/o breast cancer 


   -last brain MRI was 6/2016 will defer repeating the study to neurology





#transaminitis


   -will repeat labs in AM





Disposition: I have discussed the case with case management. At this point she 

does not seem to be a safe discharge to the streets. Currently she is living in 

her car and doesn't know where her car is located. Her daughter lives and 

[place]. The patient would like to move back to [place]


Will change to inpatient status


Subjective: no seizure overnight.  confusion is improving.  she recalls some of 

her delusions from the past few days.  she continues to deny any substance abuse


Objective: 


 Vital Signs











Temp Pulse Resp BP Pulse Ox


 


 36.4 C   84   14   92/57 L  98 


 


 06/04/17 11:33  06/04/17 11:33  06/04/17 11:33  06/04/17 11:33  06/04/17 11:33








 Laboratory Results





 06/04/17 04:37 





 











 06/03/17 06/04/17 06/05/17





 05:59 05:59 05:59


 


Intake Total  1100 


 


Balance  1100 














- Physical Exam


Constitutional: no apparent distress, appears nourished, not in pain


Cardiovascular: regular rate and rhythym, no murmur, rub, or gallop


Respiratory: no respiratory distress, no rales or rhonchi, clear to auscultation


Neurologic: AAOx3, CN II-XII Intact, No facial droop





ICD10 Worksheet


Patient Problems: 


 Problems











Problem Status Onset


 


Seizure disorder Acute  


 


Seizure Acute  


 


Scalp laceration Acute  


 


Altered mental status Acute

## 2017-06-04 NOTE — PDCONSULT
Consultant Note: 








HOSPITAL NEUROLOGY CONSULT


REQUESTING: Nacho Tse DO


REASON: AMS





HPI:





This is a 57 year-old woman with a history of breast cancer s/p chemoradiation 

and bilateral mastectomies.  She also has a history of seizures secondary to 

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


The patient had presented to our ED with some nonspecific AMS on 6/1.  She was 

brought from [facility] by EMS as staff reported AMS.  She was cleared and 

discharged at that time.  She returned to our facility on 6/3, this time by 

Lemuel Scott, as the patient had been going [id number] threatening people 

and accusing them of "trying to take my baby."  She was found to be confused, 

agitated on ER arrival.  She relayed a history of being homeless; She was 

previously living in her car, but it had apparently been towed and impounded 

with all of her possessions.  She has slightly improved since arrival.  She was 

noted to have a tongue laceration.  Labs revealed a leukocytosis, metabolic 

acidosis, elevated BUN/Cr ration, elevated bilirubin and transaminitis.


She tells me this morning that she does indeed have "grand mal" and "petit mal" 

seizures and was taking lamotrigine.  She in unsure of the dose.  She states 

she has not taken it recently.  She can't remember if she hadn't taken it due 

to not filling the medicine recently or not having it due to her car being 

impounded.  She is amnestic as to why she is here.  She tells me the last thing 

she remembers is a gentleman on the street offering her a beer, to which she 

accepted and drank.  She denies drinking in excess and denies ingestion of 

illicit substances.  Her EtOH level and UDS were negative.  


On review of her records in [facility], she has been admitted to our facility 

multiple times for breakthrough seizure, with varying reports of her 

antiseizure drug regimen.  She sees Dr. Blackman at [facility], but she can't 

identify the last time she saw this provider.  She was admitted to [facility] last 

year with a similar picture of AMS and agitation, threatening to pull a gun on 

people.  She had extensive workup including an unremarkable MRI.  She had a 

continuous vEEG which showed some right temporal slowing that resolved over 

time.  She had a psychiatry evaluation, raising the possibility of underlying 

hypomania/bipolar vs. postictal psychosis.  Risperidone 1mg nightly was started 

at that time, but seemingly she has not continued to take the medication.  With 

her prior admissions here, she has had MRI and EEG that were unremarkable as 

well. 





ROS:


As per the HPI, otherwise a complete 12 point ROS was performed and is negative





ALLERGIES AND MEDS:


As recorded in the EMR - reviewed and reconciled





PFSH:


As per the intake H&P by Dr. Tse from yesterday  





EXAM:


VS reviewed in EMR


GEN: WDWN laying in NAD


HEENT: NCAT, sclera anicteric, conjunctiva not injected, MMM, oropharynx clear, 

no scalp tenderness, right lateral tongue edema noted


NECK: supple, nontender, no meningismus


CV: RRR s1 s2 wo m/r/c/g.  Carotid pulses 2+ wo bruit


NEURO:


MS: awake, alert, oriented to self, birthdate (but not her own age), month, year

, not specific date, hospital, not situation.  Speech nondysarthric, but 

content tangential.  No language disturbance.  Follows commands.  Attends to 

both sides.  Clear episodic memory impairment on casual conversation.  Mood 

euthymic.  Adequate fund of knowledge.


CN: pupils 4mm round and reactive.  Intolerant of fundoscopy.  VFF.  Primary 

gaze centered.  Full ocular motility, but smooth pursuit with saccadic 

intrusions/motor impersistence.  Facial sensation preserved.  Face symmetric.   

Hearing grossly intact to finger rub.  Palatoglossal movements intact.  

Shoulder shrug and head turn strong.


MOTOR: normal bulk/tone.  No adventitial movements.  Full power throughout.


SENSORY: intact LT/PP throughout and symmetric.  No extinction.


COORD: no ataxia FN/HS.  Anthony preserved.  


REFLEX: plantars down.  No clonus.  DTRS 1/4.


GAIT: deferred to PT safety eval











DATA REVIEW:


Labs reviewed in EMR








PERSONALLY INTERPRETED RESULTS AND DATA:


CT head wo from 6/1 is unremarkable


Outside records from [facility] reviewed via [facility] as per the HPI








IMPRESSION AND RECOMMENDATIONS:





// ENCEPHALOPATHY


// SEIZURE





Patient with a history of seizures reportedly from MTS after getting brain 

radiation for reported CNS metastases from breast cancer, initially manifest in 

2007.


Seemingly similar picture to when she was admitted to [facility] last year, with 

extensive evaluation.


Suspect this may be more of a post-ictal psychosis given labs, tongue laceration

/edema and prior history.  Unknown what her cognitive baseline is, but she does 

have a substrate for memory deficit (brain radiation, MTS).


Seems she is noncompliant with her prescribed lamotrigine therapy.  She also is 

dehydrated with transaminitis.  There remains the possibility of another 

underlying metabolic issue, though, given her lab abnormalities.  Could have 

been from alcohol ingestion (she endorses consuming at least one beer prior to 

her presentation) - AST/ALT is close to 2:1.


For now, would continue levetiracetam, increased to 1000mg BID.  Would not 

continue lamotrigine given her liver dysfunction.  Since we don't know when her 

last ingestion of lamotrigine was, would have to wait for liver enzymes to 

normalize and would then have to start off with slow titration upwards to avoid 

Hong-Jose syndrome.  Drawing a lamotrigine level would take days to 

return.  If her transaminitis improves, another option may be valproic acid, 

specifically for its mood stabilizing properties.  


Continue with seizure safety precautions.


Would continue to observe and trend CMP/CBS to ensure resolution of the 

aforementioned abnormalities. 


Don't think MRI would be of value, given prior unremarkable studies over the 

past year.  She is improving today, so will hold on EEG for now.





Dr. Lujan to assume care of the neurology service tomorrow. 


Case discussed with Dr. Tse.

## 2017-06-05 LAB
% IMMATURE GRANULYOCYTES: 0.2 % (ref 0–1.1)
ABSOLUTE IMMATURE GRANULOCYTES: 0.01 10^3/UL (ref 0–0.1)
ABSOLUTE NRBC COUNT: 0 10^3/UL (ref 0–0.01)
ADD DIFF?: NO
ADD MORPH?: NO
ADD SCAN?: NO
ALBUMIN SERPL-MCNC: 3.1 G/DL (ref 3.5–5)
ALP SERPL-CCNC: 53 IU/L (ref 38–126)
ALT SERPL-CCNC: 70 IU/L (ref 9–52)
ANION GAP SERPL CALC-SCNC: 7 MEQ/L (ref 8–16)
AST SERPL-CCNC: 40 IU/L (ref 14–46)
ATYPICAL LYMPHOCYTE FLAG: 20 (ref 0–99)
BILIRUB SERPL-MCNC: 0.6 MG/DL (ref 0.1–1.4)
CALCIUM SERPL-MCNC: 8.5 MG/DL (ref 8.5–10.4)
CHLORIDE SERPL-SCNC: 110 MEQ/L (ref 97–110)
CO2 SERPL-SCNC: 26 MEQ/L (ref 22–31)
CREAT SERPL-MCNC: 0.7 MG/DL (ref 0.6–1)
ERYTHROCYTE [DISTWIDTH] IN BLOOD BY AUTOMATED COUNT: 13.2 % (ref 11.5–15.2)
FRAGMENT RBC FLAG: 0 (ref 0–99)
GFR SERPL CREATININE-BSD FRML MDRD: > 60 ML/MIN/{1.73_M2}
GLUCOSE SERPL-MCNC: 92 MG/DL (ref 70–100)
HCT VFR BLD CALC: 36 % (ref 38–47)
HGB BLD-MCNC: 12 G/DL (ref 12.6–16.3)
LEFT SHIFT FLG: 0 (ref 0–99)
LIPEMIA HEMOLYSIS FLAG: 80 (ref 0–99)
MCH RBC BLDCO QN: 31.4 PG (ref 27.9–34.1)
MCHC RBC AUTO-ENTMCNC: 33.3 G/DL (ref 32.4–36.7)
MCV RBC AUTO: 94.2 FL (ref 81.5–99.8)
NRBC-AUTO%: 0 % (ref 0–0.2)
PLATELET # BLD: 182 10^3/UL (ref 150–400)
PLATELET CLUMPS FLAG: 0 (ref 0–99)
PMV BLD AUTO: 10.6 FL (ref 8.7–11.7)
POTASSIUM SERPL-SCNC: 3.9 MEQ/L (ref 3.5–5.2)
PROT SERPL-MCNC: 5.4 G/DL (ref 6.3–8.2)
RBC # BLD AUTO: 3.82 10^6/UL (ref 4.18–5.33)
SODIUM SERPL-SCNC: 143 MEQ/L (ref 134–144)

## 2017-06-05 RX ADMIN — ZOLPIDEM TARTRATE PRN MG: 5 TABLET ORAL at 22:35

## 2017-06-05 NOTE — HOSPPROG
Hospitalist Progress Note


Assessment/Plan: 





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 

delusions. She denies a h/o psychiatric illness


Plan:


   -cont keppra, but will decrease dose to 500mg bid due to dizziness (

discussed the case with  Dr. Lujan who is in agreement) 


   -home dose of lamictal has been stopped due to noncompliance and risk of SJS





#vertigo suspect adverse effect from keppra vs residual from seizure 


   -continue to monitor





#resolved dehydration


   -buff cap ivf and encourage oral hydration





#h/o breast cancer 


   -last brain MRI was 6/2016 will defer repeating the study to neurology





#transaminitis (improving)


   -will repeat labs in AM





Disposition: I have discussed the case with case management. At this point she 

does not seem to be a safe discharge to the streets. Currently she is living in 

her car and doesn't know where her car is located. Her daughter lives and 

[place]. The patient would like to move back to [place]


continue inpatient care until dizziness is improved and safe to DC


Subjective: reports constant room spinning dizziness.  no numbness or weakness.

  no seizures.  overall feeling better


Objective: 


 Vital Signs











Temp Pulse Resp BP Pulse Ox


 


 36.8 C   71   18   108/60   98 


 


 06/05/17 11:46  06/05/17 11:46  06/05/17 11:46  06/05/17 11:46  06/05/17 11:46








 Laboratory Results





 06/05/17 04:33 





 06/05/17 04:33 





 











 06/04/17 06/05/17 06/06/17





 05:59 05:59 05:59


 


Intake Total  950 


 


Balance  950 














- Physical Exam


Constitutional: no apparent distress, appears nourished, not in pain


Cardiovascular: regular rate and rhythym, no murmur, rub, or gallop


Respiratory: no respiratory distress, no rales or rhonchi, clear to auscultation


Neurologic: AAOx3, CN II-XII Intact, No facial droop





ICD10 Worksheet


Patient Problems: 


 Problems











Problem Status Onset


 


Seizure disorder Acute  


 


Seizure Acute  


 


Scalp laceration Acute  


 


Altered mental status Acute

## 2017-06-05 NOTE — NEUROPROG
Assessment: 


Seizure disorder with breakthrough likely and getting back to baseline. The 

Keppra may be causing the dizziness so would cut back to 500mg BID





Total of 25 min. total unit time.


Subjective: 


Pt is reporting some dizziness. She has poor recollection of the recent events 

and is not sure what happened but has missed medication doses most likely.


Objective: 





 Vital Signs











Temp Pulse Resp BP Pulse Ox


 


 36.8 C   71   18   108/60   98 


 


 06/05/17 11:46  06/05/17 11:46  06/05/17 11:46  06/05/17 11:46  06/05/17 11:46








 Laboratory Results





 06/05/17 04:33 





 06/05/17 04:33 





 











 06/04/17 06/05/17 06/06/17





 05:59 05:59 05:59


 


Intake Total  950 


 


Balance  950 








Alert but confused about details of the event. She is currently oriented. No 

additional seizures reported.


Allergies/Adverse Reactions: 


 





Sulfa (Sulfonamide Antibiotics) Allergy (Verified 06/01/17 23:14)

## 2017-06-06 RX ADMIN — ZOLPIDEM TARTRATE PRN MG: 5 TABLET ORAL at 23:06

## 2017-06-06 NOTE — HOSPPROG
Hospitalist Progress Note


Assessment/Plan: 


57y female with c/o confusion. This is my first encounter, chart reviewed.





#acute encephalopathy (resolving) suspect postictal and psychosis with 

significant delusions. She denies a h/o psychiatric illness


Plan:


   -cont keppra, but will decrease dose to 500mg bid due to dizziness


   -home dose of lamictal has been stopped due to noncompliance and risk of SJS


   -D/W Gina Meza, appreciate consult. Will order psych MD villatoro


   -reviewed with daughter and pt at bedside.





#vertigo suspect adverse effect from keppra vs residual from seizure 


   -continue to monitor


   - better





#resolved dehydration


   -buff cap ivf and encourage oral hydration





#h/o breast cancer 


   -last brain MRI was 6/2016 will defer repeating the study to neurology


   -consider MRI of brain for thoughness





#transaminitis (improving)


   -better





Disposition: I have discussed the case with case management. 


At this point she does not seem to be a safe discharge to the streets. 


Currently she is living in her car and doesn't know where her car is located. 


Her daughter is her from [place] and lives in [place]. 


The patient would like to move back to [place]


continue inpatient care until dizziness is improved and safe to DC


await psych eval


Subjective: [place] better. Concerned that she is having memory issues.


Objective: 


 Vital Signs











Temp Pulse Resp BP Pulse Ox


 


 36.7 C   64   16   117/60   97 


 


 06/06/17 12:00  06/06/17 12:00  06/06/17 12:00  06/06/17 12:00  06/06/17 12:00








 Laboratory Results





 06/05/17 04:33 





 06/05/17 04:33 





 











 06/05/17 06/06/17 06/07/17





 05:59 05:59 05:59


 


Intake Total 950 300 


 


Balance 950 300 














- Physical Exam


Constitutional: no apparent distress, appears nourished, not in pain


Eyes: PERRL, anicteric sclera, EOMI


Ears, Nose, Mouth, Throat: moist mucous membranes, hearing normal, ears appear 

normal


Cardiovascular: No JVD, No tachycardia, No edema


Respiratory: no respiratory distress, no rales or rhonchi, reduced air movement


Gastrointestinal: No tenderness, No ascites, No guarding


Skin: warm, normal color, No erythema


Musculoskeletal: no joint effusions, pain with ROM, generalized weakness


Psychiatric: not encephalopathic, anxious, poor insight, poor judgement, poor 

memory





ICD10 Worksheet


Patient Problems: 


 Problems











Problem Status Onset


 


Seizure disorder Acute  


 


Seizure Acute  


 


Scalp laceration Acute  


 


Altered mental status Acute

## 2017-06-07 VITALS — RESPIRATION RATE: 16 BRPM | HEART RATE: 68 BPM

## 2017-06-07 RX ADMIN — ZOLPIDEM TARTRATE PRN MG: 5 TABLET ORAL at 21:54
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
## 2017-06-07 NOTE — HOSPPROG
Hospitalist Progress Note


Assessment/Plan: 


57y female with c/o confusion.





#acute encephalopathy (resolving) suspect postictal and psychosis with 

significant delusions. She denies a h/o psychiatric illness


Plan:


   -cont keppra, but will decrease dose to 500mg bid due to dizziness


   -home dose of lamictal has been stopped due to noncompliance and risk of SJS


   -Gina Meza, appreciate consult. Will ordered psych MD villatoro. D/W Dr Bae


   -reviewed with daughter and pt at bedside.





#vertigo suspect adverse effect from keppra vs residual from seizure 


   -continue to monitor


   - better





#resolved dehydration


   -buff cap ivf and encourage oral hydration





#h/o breast cancer 


   -last brain MRI was 6/2016 will defer repeating the study to neurology


   -consider MRI of brain for thoughness





#transaminitis (improving)


   -better





Disposition: I have discussed the case with case management. 


At this point she does not seem to be a safe discharge to the streets. 


Currently she is living in her car and doesn't know where her car is located. 


Her daughter is her from [place] and lives in [place]. 


The patient would like to move back to [place]


continue inpatient care until dizziness is improved and safe to DC


await psych shauna


Subjective: Very tired today. Still having confusion. No pain.


Objective: 


 Vital Signs











Temp Pulse Resp BP Pulse Ox


 


 36.7 C   67   16   114/62   97 


 


 06/07/17 07:50  06/07/17 07:50  06/07/17 07:50  06/07/17 07:50  06/07/17 07:50








 Laboratory Results





 06/05/17 04:33 





 06/05/17 04:33 





 











 06/06/17 06/07/17 06/08/17





 05:59 05:59 05:59


 


Intake Total 300 2500 


 


Balance 300 2500 














- Physical Exam


Constitutional: no apparent distress, appears nourished, not in pain


Eyes: PERRL, anicteric sclera, EOMI


Ears, Nose, Mouth, Throat: moist mucous membranes, hearing normal, ears appear 

normal


Cardiovascular: No JVD, No tachycardia, No edema


Respiratory: no respiratory distress, no rales or rhonchi, reduced air movement


Gastrointestinal: No tenderness, No ascites, No guarding


Skin: warm, normal color, No erythema


Musculoskeletal: normal joint ROM, no joint effusions, generalized weakness


Neurologic: AAOx3


Psychiatric: not encephalopathic, anxious, poor insight, poor judgement, poor 

memory





ICD10 Worksheet


Patient Problems: 


 Problems











Problem Status Onset


 


Seizure disorder Acute  


 


Seizure Acute  


 


Scalp laceration Acute  


 


Altered mental status Acute

## 2017-06-08 VITALS — OXYGEN SATURATION: 94 % | TEMPERATURE: 98.1 F | DIASTOLIC BLOOD PRESSURE: 67 MMHG | SYSTOLIC BLOOD PRESSURE: 108 MMHG

## 2017-06-08 NOTE — HOSPPROG
Hospitalist Progress Note


Assessment/Plan: 





57y female with c/o confusion.





#acute encephalopathy (resolving) 


   Suspect this was secondary from seizure


   She is alert and oriented and appropriate today


   





#seizure disorder w breakthrough seizure


   keppra/no driving


   had some dizziness form this





#vertigo suspect adverse effect from keppra vs residual from seizure 


   -continue to monitor


   - better





#h/o breast cancer 


   -last brain MRI was 6/2016 will defer repeating the study to neurology





#transaminitis (improving)


   -follow up in the outpatient setting





Disposition:


   Patient is to leave with her daughter today to [place].  It is critical that 

she follows up with the neurologist and psychiatrist.


   Case management has spoken to the psychiatrist who is in agreement with this.


   The patient and the daughter know the patient is not allowed to drive and 

must be very compliant with the Keppra. 


Subjective: Charline is feeling much better.  Is anxious to leave


Objective: 


 Vital Signs











Temp Pulse Resp BP Pulse Ox


 


 36.7 C   68   16   108/67   94 


 


 06/08/17 07:38  06/08/17 07:38  06/08/17 07:38  06/08/17 07:38  06/08/17 07:38








 Laboratory Results





 06/05/17 04:33 





 06/05/17 04:33 





 











 06/07/17 06/08/17 06/09/17





 05:59 05:59 05:59


 


Intake Total 2500  


 


Balance 2500  














- Physical Exam


Constitutional: no apparent distress, appears nourished, not in pain


Eyes: PERRL


Ears, Nose, Mouth, Throat: hearing normal


Respiratory: no respiratory distress


Skin: warm


Musculoskeletal: full muscle strength, no muscle tenderness


Neurologic: AAOx3


Psychiatric: interacting appropriately, not anxious, not encephalopathic





ICD10 Worksheet


Patient Problems: 


 Problems











Problem Status Onset


 


Altered mental status Acute  


 


Scalp laceration Acute  


 


Seizure Acute  


 


Seizure disorder Acute

## 2018-01-31 ENCOUNTER — HOSPITAL ENCOUNTER (EMERGENCY)
Dept: HOSPITAL 80 - FED | Age: 58
LOS: 1 days | Discharge: HOME | End: 2018-02-01
Payer: COMMERCIAL

## 2018-01-31 VITALS — RESPIRATION RATE: 16 BRPM | TEMPERATURE: 98.4 F

## 2018-01-31 DIAGNOSIS — Z85.3: ICD-10-CM

## 2018-01-31 DIAGNOSIS — E86.9: ICD-10-CM

## 2018-01-31 DIAGNOSIS — F43.10: Primary | ICD-10-CM

## 2018-01-31 LAB — PLATELET # BLD: 286 10^3/UL (ref 150–400)

## 2018-01-31 PROCEDURE — 3E0337Z INTRODUCTION OF ELECTROLYTIC AND WATER BALANCE SUBSTANCE INTO PERIPHERAL VEIN, PERCUTANEOUS APPROACH: ICD-10-PCS | Performed by: EMERGENCY MEDICINE

## 2018-01-31 NOTE — EDPHY
HPI/HX/ROS/PE/MDM





- Data Points


Imaging: Discussed imaging studies w/ On call Radiologist, I viewed and 

interpreted images myself


Narrative: 





CHIEF COMPLAINT: AMS, possible seizure





HPI: The patient is a 58 y/o female with a history of epilepsy arriving via EMS 

for evaluation of confusion after she was found at a bus stop tonight and didn'

t know how to get home. She has been admitted here previously for altered 

mental status, most recently on 6/3/17, and it was thought she may have had a 

postictal psychosis. She had extensive work up for similar symptoms previously 

and after her most recent admission they recommended outpatient followup with 

neurology and psychiatry. EMS was called to a seizure today, but did not 

witness a seizure nor were they able to speak with a bystander on scene who may 

have observed this. The patient told EMS she hasn't slept for 5 days after 

abruptly stopping her Ambien. She says she did take her Lamictal today. History 

is difficult to obtain due to AMS. 





REVIEW OF SYSTEMS:


Limited by AMS. 





PMH: Seizure disorder with petite mal and grand mal seizures followed by Dr. Blackman in [place]; breast cancer status post bilateral mastectomy and 

reconstruction





SOCIAL HISTORY: Lives in [place]. Denies illicit drugs, tobacco, alcohol.





Prior medical records reviewed including admission 6/4/17 for AMS.  





PHYSICAL EXAM:


General:Patient is alert, in no acute distress.


ENT:Eyes are normal to inspection.  ENT inspection normal.


Neck: Normal inspection.  Full range of motion.


Respiratory:No respiratory distress.  Breath sounds normal bilaterally.


Cardiovascular: Regular rate and rhythm.  Strong peripheral pulses.  Normal cap 

refill.


Abdomen:The abdomen is nontender to palpation. There are no peritoneal signs. 


Back: Normal to inspection.  No tenderness to palpation.


Skin: Normal color.  No rash.  Warm and dry.


Extremities: Normal appearance.  Full range of motion.


Neuro: Confused.  Normal motor function.  Normal sensory function. (David Tucker)


ED Course: 


This is a 58 y/o with a seizure disorder and possibly concurrent psychiatric 

illness who presents with AMS after she was found confused at a bus stop 

tonight. She has been admitted previously for similar presentations and last 

year it was thought she may have a postictal psychosis that cleared after a few 

days. We are unable to determine if she had a seizure tonight. No evidence of 

trauma on exam. She continues to be somewhat confused. Plan for labs including 

drug screen and head CT. 1L IV NS administered. 





Head CT: negative per Dr. Gaviria, radiologist. 





 (David Tucker)


MDM: 





This patient presents with severe AMS in the setting of seizure disorder. She 

has previously been admitted for what has been termed postictal psychosis that 

lasts for days before clearing.  However, the patient had no witnessed seizure 

tonight, and has no signs of trauma.  She tells me that she has been awake for 

at least six days straight, so while she may have indeed had a seizure, it 

seems clear that another mental health issue is present.  As such, I think she 

would benefit from psychiatric evaluation here in the ED.  She is medically 

clear.  I have placed her on a detainer for the time being for grave disability

, pending mental health evaluation.  (David Tucker)





0423AM:  Patient has been seen in mental health.  They do feel that she is safe 

for discharge. She does not want hurt herself or anybody else.  She does not 

appear gravely disable.  She is enrolled [facility] infections in 

[facility] and has care team.


Patient is requesting be discharged he would like to go home she states she has 

to go work tomorrow. (Rafael Navarrete)





- Data Points


Imaging Results: 





 Imaging Impressions





Head CT  01/31/18 20:52


Impression: Negative for hemorrhage or mass lesion.. Specifically, a seizure 

focus is not identified. Suspect small vessel microvascular disease.


 


Results called and discussed with David Tucker MD on 1/31/2018 at 21:39











Laboratory Results: 





 Laboratory Results





 01/31/18 20:55 





 01/31/18 20:55 





 











  01/31/18 01/31/18 01/31/18





  21:11 20:55 20:55


 


WBC      6.21 10^3/uL 10^3/uL





     (3.80-9.50) 


 


RBC      4.57 10^6/uL 10^6/uL





     (4.18-5.33) 


 


Hgb      14.7 g/dL g/dL





     (12.6-16.3) 


 


Hct      41.5 % %





     (38.0-47.0) 


 


MCV      90.8 fL fL





     (81.5-99.8) 


 


MCH      32.2 pg pg





     (27.9-34.1) 


 


MCHC      35.4 g/dL g/dL





     (32.4-36.7) 


 


RDW      12.5 % %





     (11.5-15.2) 


 


Plt Count      286 10^3/uL 10^3/uL





     (150-400) 


 


MPV      10.1 fL fL





     (8.7-11.7) 


 


Neut % (Auto)      54.3 % %





     (39.3-74.2) 


 


Lymph % (Auto)      37.2 % %





     (15.0-45.0) 


 


Mono % (Auto)      6.4 % %





     (4.5-13.0) 


 


Eos % (Auto)      1.4 % %





     (0.6-7.6) 


 


Baso % (Auto)      0.5 % %





     (0.3-1.7) 


 


Nucleat RBC Rel Count      0.0 % %





     (0.0-0.2) 


 


Absolute Neuts (auto)      3.37 10^3/uL 10^3/uL





     (1.70-6.50) 


 


Absolute Lymphs (auto)      2.31 10^3/uL 10^3/uL





     (1.00-3.00) 


 


Absolute Monos (auto)      0.40 10^3/uL 10^3/uL





     (0.30-0.80) 


 


Absolute Eos (auto)      0.09 10^3/uL 10^3/uL





     (0.03-0.40) 


 


Absolute Basos (auto)      0.03 10^3/uL 10^3/uL





     (0.02-0.10) 


 


Absolute Nucleated RBC      0.00 10^3/uL 10^3/uL





     (0-0.01) 


 


Immature Gran %      0.2 % %





     (0.0-1.1) 


 


Immature Gran #      0.01 10^3/uL 10^3/uL





     (0.00-0.10) 


 


Sodium    141 mEq/L mEq/L  





    (135-145)  


 


Potassium    3.3 mEq/L L mEq/L  





    (3.5-5.2)  


 


Chloride    101 mEq/L mEq/L  





    ()  


 


Carbon Dioxide    22 mEq/l mEq/l  





    (22-31)  


 


Anion Gap    18 mEq/L H mEq/L  





    (8-16)  


 


BUN    7 mg/dL mg/dL  





    (7-23)  


 


Creatinine    0.8 mg/dL mg/dL  





    (0.6-1.0)  


 


Estimated GFR    > 60   





    


 


Glucose    95 mg/dL mg/dL  





    ()  


 


Calcium    10.3 mg/dL mg/dL  





    (8.5-10.4)  


 


Troponin I    < 0.012 ng/mL ng/mL  





    (0.000-0.034)  


 


TSH    2.900 uIU/mL uIU/mL  





    (0.465-4.680)  


 


Urine Opiates Screen  NEGATIVE     





   (NEGATIVE)   


 


Urine Barbiturates  NEGATIVE     





   (NEGATIVE)   


 


Ur Phencyclidine Scrn  NEGATIVE     





   (NEGATIVE)   


 


Ur Amphetamine Screen  NEGATIVE     





   (NEGATIVE)   


 


U Benzodiazepines Scrn  NEGATIVE     





   (NEGATIVE)   


 


Urine Cocaine Screen  NEGATIVE     





   (NEGATIVE)   


 


U Marijuana (THC) Screen  NEGATIVE     





   (NEGATIVE)   











Medications Given: 





 








Discontinued Medications





Sodium Chloride (Ns)  1,000 mls @ 0 mls/hr IV EDNOW ONE; Wide Open


   PRN Reason: Protocol


   Stop: 01/31/18 20:45


   Last Admin: 01/31/18 21:22 Dose:  1,000 mls








General


Initial Vital Signs: 





 Initial Vital Signs











Temperature (C)  36.9 C   01/31/18 20:45


 


Heart Rate  70   01/31/18 20:45


 


Respiratory Rate  16   01/31/18 20:45


 


Blood Pressure  133/76 H  01/31/18 20:45


 


O2 Sat (%)  99   01/31/18 20:45








 











O2 Delivery Mode               Room Air














Allergies/Adverse Reactions: 


 





Sulfa (Sulfonamide Antibiotics) Allergy (Verified 06/01/17 23:14)


 








Home Medications: 














 Medication  Instructions  Recorded


 


Zolpidem Tartrate [Ambien 10 mg] 10 mg PO HS 03/16/17


 


Lamictal  01/31/18














Departure





- Departure


Disposition: Home, Routine, Self-Care


Clinical Impression: 


 PTSD (post-traumatic stress disorder)





Condition: Good


Instructions:  Post Traumatic Stress Disorder (ED)


Additional Instructions: 


1.  Return emergency room if you have any worsening symptoms questions or 

concerns.


Referrals: 


Patient,NotPresent [Unknown] - As per Instructions


Report Scribed for: David Tucker


Report Scribed by: Carito Byrne


Date of Report: 01/31/18


Time of Report: 20:50


Physician Review and Approval Statement: Portions of this note were transcribed 

by an ED scribe.  I personally performed the history, physical exam, and 

medical decision making; and confirm the accuracy of the information in the 

transcribed note.

## 2018-02-01 VITALS — SYSTOLIC BLOOD PRESSURE: 126 MMHG | DIASTOLIC BLOOD PRESSURE: 84 MMHG | HEART RATE: 76 BPM | OXYGEN SATURATION: 98 %

## 2018-04-02 ENCOUNTER — HOSPITAL ENCOUNTER (EMERGENCY)
Dept: HOSPITAL 80 - FED | Age: 58
Discharge: HOME | End: 2018-04-02
Payer: COMMERCIAL

## 2018-04-02 VITALS — DIASTOLIC BLOOD PRESSURE: 92 MMHG | SYSTOLIC BLOOD PRESSURE: 161 MMHG
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
## 2018-04-02 DIAGNOSIS — S00.01XA: Primary | ICD-10-CM
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
## 2018-04-02 DIAGNOSIS — Z23: ICD-10-CM

## 2018-04-02 PROCEDURE — 3E0234Z INTRODUCTION OF SERUM, TOXOID AND VACCINE INTO MUSCLE, PERCUTANEOUS APPROACH: ICD-10-PCS
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
## 2021-05-13 NOTE — GDS
[f rep st]



                                                             DISCHARGE SUMMARY





DISCHARGE DIAGNOSES:  

1.  Acute encephalopathy.

2.  Seizure disorder with breakthrough seizures.

3.  Vertigo with associated dizziness from Keppra.

4.  History of breast cancer.

5.  Transaminitis.



CONSULTATIONS:  During her stay.

1.  Hunter Mcmullen DO.

2.  Ant Bae MD.



BRIEF HISTORY:  The patient is a 57-year-old woman with a history of disorder who was brought to the
 ER after she was found reported knocking on doors and threatening people stating, "You're trying to
 take my baby."  The patient was agitated and pacing.  She had similar symptoms earlier this month, 
and cleared after mentation improved.  She was confused and disoriented during her stay.  She was se
en and evaluated by Neurology who noted that she had similar picture when she was admitted to [facility]
st year with extensive evaluation.  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
erated her tongue.  She has improved today.  In addition, she was seen by Dr. Bae with Psychiatry
 who is recommending that she get further follow up with the neurologist and psychiatrist in the out
patient setting.



HOSPITAL COURSE PER PROBLEM:  

1.  Acute encephalopathy.  This has resolved.  This is likely from the seizure.  She is alert and or
iented and appropriate today.

2.  Seizure disorder with breakthrough seizure.  She has been placed on Keppra, and I told her and h
er daughter she cannot drive.

3.  Vertigo.  This was secondary from the Keppra.  She is no longer having this.

4.  History of breast cancer.  She had an MRI in 2016.

5.  Transaminitis.  Improving, she needs further follow up in the outpatient setting.



CONDITION AT DISCHARGE:  Stable.  Blood pressure is 108/67, O2 saturation on room air 94%, respirato
ry rate is 16, pulse is 68, temperature 36.7 Celsius.



MEDICATIONS AT DISCHARGE:  Please see the EMR.



DISCHARGE INSTRUCTIONS:  

1.  No driving.

2.  To continue the Keppra.  She will get a month's supply.  She will be traveling with her daughter
 today back to the [place] area.

3.  She needs close followup with the neurologist, psychiatrist and a primary care provider. 



Greater than 30 minutes discharging and coordinating care.





Job #:  [id number] 86 y/o male with PMHx of diverticulitis, RA, s/p appendectomy presents to the ED c/o slurred speech since 1AM, witnessed by pt's wife. Pt endorses difficulty word finding at this time. Denies any other complaints at this time.